# Patient Record
Sex: FEMALE | Race: WHITE | Employment: OTHER | URBAN - METROPOLITAN AREA
[De-identification: names, ages, dates, MRNs, and addresses within clinical notes are randomized per-mention and may not be internally consistent; named-entity substitution may affect disease eponyms.]

---

## 2021-02-11 ENCOUNTER — APPOINTMENT (OUTPATIENT)
Dept: CT IMAGING | Facility: CLINIC | Age: 71
DRG: 857 | End: 2021-02-11
Attending: PHYSICIAN ASSISTANT
Payer: COMMERCIAL

## 2021-02-11 ENCOUNTER — HOSPITAL ENCOUNTER (INPATIENT)
Facility: CLINIC | Age: 71
LOS: 2 days | Discharge: HOME HEALTH | DRG: 857 | End: 2021-02-13
Attending: INTERNAL MEDICINE | Admitting: INTERNAL MEDICINE
Payer: COMMERCIAL

## 2021-02-11 DIAGNOSIS — M86.60 CHRONIC OSTEOMYELITIS (H): Primary | ICD-10-CM

## 2021-02-11 PROBLEM — L02.01 SUBMENTAL ABSCESS: Status: ACTIVE | Noted: 2021-02-11

## 2021-02-11 LAB
ALBUMIN SERPL-MCNC: 3.6 G/DL (ref 3.4–5)
ALP SERPL-CCNC: 82 U/L (ref 40–150)
ALT SERPL W P-5'-P-CCNC: 30 U/L (ref 0–50)
ANION GAP SERPL CALCULATED.3IONS-SCNC: 7 MMOL/L (ref 3–14)
AST SERPL W P-5'-P-CCNC: 22 U/L (ref 0–45)
BILIRUB SERPL-MCNC: 0.4 MG/DL (ref 0.2–1.3)
BUN SERPL-MCNC: 17 MG/DL (ref 7–30)
CALCIUM SERPL-MCNC: 9.4 MG/DL (ref 8.5–10.1)
CHLORIDE SERPL-SCNC: 106 MMOL/L (ref 94–109)
CO2 SERPL-SCNC: 27 MMOL/L (ref 20–32)
CREAT SERPL-MCNC: 0.76 MG/DL (ref 0.52–1.04)
ERYTHROCYTE [DISTWIDTH] IN BLOOD BY AUTOMATED COUNT: 12.7 % (ref 10–15)
GFR SERPL CREATININE-BSD FRML MDRD: 78 ML/MIN/{1.73_M2}
GLUCOSE SERPL-MCNC: 146 MG/DL (ref 70–99)
HCT VFR BLD AUTO: 37.5 % (ref 35–47)
HGB BLD-MCNC: 12.5 G/DL (ref 11.7–15.7)
LABORATORY COMMENT REPORT: NORMAL
MAGNESIUM SERPL-MCNC: 2.2 MG/DL (ref 1.6–2.3)
MCH RBC QN AUTO: 30.3 PG (ref 26.5–33)
MCHC RBC AUTO-ENTMCNC: 33.3 G/DL (ref 31.5–36.5)
MCV RBC AUTO: 91 FL (ref 78–100)
PLATELET # BLD AUTO: 395 10E9/L (ref 150–450)
POTASSIUM SERPL-SCNC: 2.7 MMOL/L (ref 3.4–5.3)
POTASSIUM SERPL-SCNC: 3.1 MMOL/L (ref 3.4–5.3)
PROT SERPL-MCNC: 8.2 G/DL (ref 6.8–8.8)
RBC # BLD AUTO: 4.13 10E12/L (ref 3.8–5.2)
SARS-COV-2 RNA RESP QL NAA+PROBE: NEGATIVE
SODIUM SERPL-SCNC: 140 MMOL/L (ref 133–144)
SPECIMEN SOURCE: NORMAL
WBC # BLD AUTO: 10.7 10E9/L (ref 4–11)

## 2021-02-11 PROCEDURE — 86140 C-REACTIVE PROTEIN: CPT | Performed by: PHYSICIAN ASSISTANT

## 2021-02-11 PROCEDURE — 70487 CT MAXILLOFACIAL W/DYE: CPT

## 2021-02-11 PROCEDURE — 87635 SARS-COV-2 COVID-19 AMP PRB: CPT | Performed by: PHYSICIAN ASSISTANT

## 2021-02-11 PROCEDURE — 250N000009 HC RX 250: Performed by: INTERNAL MEDICINE

## 2021-02-11 PROCEDURE — 93005 ELECTROCARDIOGRAM TRACING: CPT

## 2021-02-11 PROCEDURE — 85027 COMPLETE CBC AUTOMATED: CPT | Performed by: PHYSICIAN ASSISTANT

## 2021-02-11 PROCEDURE — 99223 1ST HOSP IP/OBS HIGH 75: CPT | Mod: AI | Performed by: PHYSICIAN ASSISTANT

## 2021-02-11 PROCEDURE — 250N000013 HC RX MED GY IP 250 OP 250 PS 637: Performed by: PHYSICIAN ASSISTANT

## 2021-02-11 PROCEDURE — 83735 ASSAY OF MAGNESIUM: CPT | Performed by: PHYSICIAN ASSISTANT

## 2021-02-11 PROCEDURE — 84132 ASSAY OF SERUM POTASSIUM: CPT | Performed by: PHYSICIAN ASSISTANT

## 2021-02-11 PROCEDURE — 36415 COLL VENOUS BLD VENIPUNCTURE: CPT | Performed by: PHYSICIAN ASSISTANT

## 2021-02-11 PROCEDURE — 93010 ELECTROCARDIOGRAM REPORT: CPT | Performed by: INTERNAL MEDICINE

## 2021-02-11 PROCEDURE — 87040 BLOOD CULTURE FOR BACTERIA: CPT | Performed by: PHYSICIAN ASSISTANT

## 2021-02-11 PROCEDURE — 120N000001 HC R&B MED SURG/OB

## 2021-02-11 PROCEDURE — 80053 COMPREHEN METABOLIC PANEL: CPT | Performed by: PHYSICIAN ASSISTANT

## 2021-02-11 PROCEDURE — 250N000011 HC RX IP 250 OP 636: Performed by: INTERNAL MEDICINE

## 2021-02-11 PROCEDURE — 250N000011 HC RX IP 250 OP 636: Performed by: PHYSICIAN ASSISTANT

## 2021-02-11 RX ORDER — NALOXONE HYDROCHLORIDE 0.4 MG/ML
0.4 INJECTION, SOLUTION INTRAMUSCULAR; INTRAVENOUS; SUBCUTANEOUS
Status: DISCONTINUED | OUTPATIENT
Start: 2021-02-11 | End: 2021-02-13 | Stop reason: HOSPADM

## 2021-02-11 RX ORDER — PROCHLORPERAZINE MALEATE 5 MG
5 TABLET ORAL EVERY 6 HOURS PRN
Status: DISCONTINUED | OUTPATIENT
Start: 2021-02-11 | End: 2021-02-13 | Stop reason: HOSPADM

## 2021-02-11 RX ORDER — ONDANSETRON 4 MG/1
4 TABLET, ORALLY DISINTEGRATING ORAL EVERY 6 HOURS PRN
Status: DISCONTINUED | OUTPATIENT
Start: 2021-02-11 | End: 2021-02-13 | Stop reason: HOSPADM

## 2021-02-11 RX ORDER — AMOXICILLIN 250 MG
2 CAPSULE ORAL 2 TIMES DAILY PRN
Status: DISCONTINUED | OUTPATIENT
Start: 2021-02-11 | End: 2021-02-13 | Stop reason: HOSPADM

## 2021-02-11 RX ORDER — OXYCODONE HYDROCHLORIDE 5 MG/1
5 TABLET ORAL
Status: DISCONTINUED | OUTPATIENT
Start: 2021-02-11 | End: 2021-02-11

## 2021-02-11 RX ORDER — ACETAMINOPHEN 650 MG/1
650 SUPPOSITORY RECTAL EVERY 4 HOURS PRN
Status: DISCONTINUED | OUTPATIENT
Start: 2021-02-11 | End: 2021-02-13 | Stop reason: HOSPADM

## 2021-02-11 RX ORDER — AMLODIPINE BESYLATE 2.5 MG/1
2.5 TABLET ORAL EVERY MORNING
COMMUNITY

## 2021-02-11 RX ORDER — POTASSIUM CHLORIDE 1500 MG/1
40 TABLET, EXTENDED RELEASE ORAL
Status: COMPLETED | OUTPATIENT
Start: 2021-02-11 | End: 2021-02-11

## 2021-02-11 RX ORDER — POTASSIUM CHLORIDE 1500 MG/1
40 TABLET, EXTENDED RELEASE ORAL ONCE
Status: DISCONTINUED | OUTPATIENT
Start: 2021-02-11 | End: 2021-02-12

## 2021-02-11 RX ORDER — IBUPROFEN 200 MG
600 TABLET ORAL 3 TIMES DAILY PRN
COMMUNITY

## 2021-02-11 RX ORDER — LIDOCAINE 40 MG/G
CREAM TOPICAL
Status: DISCONTINUED | OUTPATIENT
Start: 2021-02-11 | End: 2021-02-13 | Stop reason: HOSPADM

## 2021-02-11 RX ORDER — NALOXONE HYDROCHLORIDE 0.4 MG/ML
0.2 INJECTION, SOLUTION INTRAMUSCULAR; INTRAVENOUS; SUBCUTANEOUS
Status: DISCONTINUED | OUTPATIENT
Start: 2021-02-11 | End: 2021-02-13 | Stop reason: HOSPADM

## 2021-02-11 RX ORDER — LEVOTHYROXINE SODIUM 75 UG/1
75 TABLET ORAL
COMMUNITY

## 2021-02-11 RX ORDER — ACETAMINOPHEN 500 MG
500 TABLET ORAL 3 TIMES DAILY PRN
COMMUNITY

## 2021-02-11 RX ORDER — AMOXICILLIN 250 MG
1 CAPSULE ORAL 2 TIMES DAILY PRN
Status: DISCONTINUED | OUTPATIENT
Start: 2021-02-11 | End: 2021-02-13 | Stop reason: HOSPADM

## 2021-02-11 RX ORDER — ONDANSETRON 2 MG/ML
4 INJECTION INTRAMUSCULAR; INTRAVENOUS EVERY 6 HOURS PRN
Status: DISCONTINUED | OUTPATIENT
Start: 2021-02-11 | End: 2021-02-13 | Stop reason: HOSPADM

## 2021-02-11 RX ORDER — AMLODIPINE BESYLATE 2.5 MG/1
2.5 TABLET ORAL EVERY MORNING
Status: DISCONTINUED | OUTPATIENT
Start: 2021-02-12 | End: 2021-02-13 | Stop reason: HOSPADM

## 2021-02-11 RX ORDER — BISACODYL 10 MG
10 SUPPOSITORY, RECTAL RECTAL DAILY PRN
Status: DISCONTINUED | OUTPATIENT
Start: 2021-02-11 | End: 2021-02-13 | Stop reason: HOSPADM

## 2021-02-11 RX ORDER — POLYETHYLENE GLYCOL 3350 17 G/17G
17 POWDER, FOR SOLUTION ORAL DAILY PRN
Status: DISCONTINUED | OUTPATIENT
Start: 2021-02-11 | End: 2021-02-13 | Stop reason: HOSPADM

## 2021-02-11 RX ORDER — PROCHLORPERAZINE 25 MG
12.5 SUPPOSITORY, RECTAL RECTAL EVERY 12 HOURS PRN
Status: DISCONTINUED | OUTPATIENT
Start: 2021-02-11 | End: 2021-02-13 | Stop reason: HOSPADM

## 2021-02-11 RX ORDER — ACETAMINOPHEN 325 MG/1
650 TABLET ORAL EVERY 4 HOURS PRN
Status: DISCONTINUED | OUTPATIENT
Start: 2021-02-11 | End: 2021-02-13 | Stop reason: HOSPADM

## 2021-02-11 RX ORDER — LEVOTHYROXINE SODIUM 75 UG/1
75 TABLET ORAL
Status: DISCONTINUED | OUTPATIENT
Start: 2021-02-12 | End: 2021-02-13 | Stop reason: HOSPADM

## 2021-02-11 RX ORDER — HYDROMORPHONE HYDROCHLORIDE 1 MG/ML
.3-.5 INJECTION, SOLUTION INTRAMUSCULAR; INTRAVENOUS; SUBCUTANEOUS
Status: DISCONTINUED | OUTPATIENT
Start: 2021-02-11 | End: 2021-02-13 | Stop reason: HOSPADM

## 2021-02-11 RX ORDER — ROSUVASTATIN CALCIUM 5 MG/1
5 TABLET, COATED ORAL EVERY MORNING
COMMUNITY

## 2021-02-11 RX ORDER — ERTAPENEM 1 G/1
1 INJECTION, POWDER, LYOPHILIZED, FOR SOLUTION INTRAMUSCULAR; INTRAVENOUS EVERY 24 HOURS
Status: DISCONTINUED | OUTPATIENT
Start: 2021-02-11 | End: 2021-02-13 | Stop reason: HOSPADM

## 2021-02-11 RX ORDER — IOPAMIDOL 755 MG/ML
80 INJECTION, SOLUTION INTRAVASCULAR ONCE
Status: COMPLETED | OUTPATIENT
Start: 2021-02-11 | End: 2021-02-11

## 2021-02-11 RX ORDER — ROSUVASTATIN CALCIUM 5 MG/1
5 TABLET, COATED ORAL EVERY MORNING
Status: DISCONTINUED | OUTPATIENT
Start: 2021-02-12 | End: 2021-02-13 | Stop reason: HOSPADM

## 2021-02-11 RX ADMIN — Medication 1 SPRAY: at 18:04

## 2021-02-11 RX ADMIN — POTASSIUM CHLORIDE 40 MEQ: 1500 TABLET, EXTENDED RELEASE ORAL at 18:36

## 2021-02-11 RX ADMIN — ERTAPENEM SODIUM 1 G: 1 INJECTION, POWDER, LYOPHILIZED, FOR SOLUTION INTRAMUSCULAR; INTRAVENOUS at 18:36

## 2021-02-11 RX ADMIN — OXYCODONE HYDROCHLORIDE 2.5 MG: 5 TABLET ORAL at 18:01

## 2021-02-11 RX ADMIN — ACETAMINOPHEN 650 MG: 325 TABLET, FILM COATED ORAL at 18:01

## 2021-02-11 RX ADMIN — SODIUM CHLORIDE 60 ML: 9 INJECTION, SOLUTION INTRAVENOUS at 18:17

## 2021-02-11 RX ADMIN — IOPAMIDOL 80 ML: 755 INJECTION, SOLUTION INTRAVENOUS at 18:17

## 2021-02-11 SDOH — HEALTH STABILITY: MENTAL HEALTH: HOW MANY STANDARD DRINKS CONTAINING ALCOHOL DO YOU HAVE ON A TYPICAL DAY?: NOT ASKED

## 2021-02-11 SDOH — HEALTH STABILITY: MENTAL HEALTH: HOW OFTEN DO YOU HAVE A DRINK CONTAINING ALCOHOL?: MONTHLY OR LESS

## 2021-02-11 SDOH — HEALTH STABILITY: MENTAL HEALTH: HOW OFTEN DO YOU HAVE 6 OR MORE DRINKS ON ONE OCCASION?: NOT ASKED

## 2021-02-11 ASSESSMENT — MIFFLIN-ST. JEOR: SCORE: 1166.79

## 2021-02-11 ASSESSMENT — ACTIVITIES OF DAILY LIVING (ADL): ADLS_ACUITY_SCORE: 17

## 2021-02-11 NOTE — CONSULTS
"Consultation Note    Chief Complaint:  \"There's a lump under my tongue.\"  History of Present Illness:  The patient is a 70 year old female with a past history of hypertension and hypercholesterolemia referred by an outside provider for evaluation of the anterior mandible. The patient's history in this region dates back to 11/2019 when tooth #25 was removed and grafted. An implant was subsequently placed (5/20) and removed (6/20). The neighboring tooth (#24) had a root canal completed at that time as well. The #25 implant was replaced (1/19/21). Tooth #24 subsequently became symptomatic and was removed. The extraction site developed discomfort and was noted to have poor healing this last weekend on exam with the patient's outside provider. This area was debrided and the patient was placed on clindamycin last weekend. The patient earlier today to her dentist and with the amount of bone loss was planned for removal of teeth #23, implant #25 and #26 with debridement. Purulent draingae was obtained. The patient was then referred to me for further evaluation. On exam today she complains of a \"lump under my tongue, and the pain is coming back.\" Patient denies fevers/chills, nause/avomiting, dysphagia, odynophagia.     Patient is a poor history and  is helpful for this portion of the evaluation.     Past Medical/Surgical History/Medications/Allergies/Adverse Reactions:  Reviewed.  Hypertension, hypothyroid, hypercholesterolemia. Prior hip replacement and toe surgery without anesthesia complications. Allergy to penicillin.    Review of Systems:  Reviewed as noted in the HPI.   Social History:  Non-smoker, no illicit drug use.     Physical Exam:  HEENT:  Submental edema and induration. Inferior mandibular border palpable. No submandibular swelling. Mandibular opening >45mm. No trismus. No lymphadenopathy.   Oral:  Extraction sites #23,24,25,26 with clot present. No hemorrhage. Right floor of mouth edema and ecchymosis. " No posterior sublingual swelling. No drainage. No purulence. No buccal vestibular swelling or fluctuance. No lateral pharyngeal swelling. Class I mobility of #22,27.     Imaging:  Periapical radiographs reviewed. Crestal bone loss associated with implant #25, extraction site #24 and bone loss #23,26 mesial.     CBCT mandible obtained to evaluate anterior mandible. Motion artifact. Left septal deviation. Normal nasal and sinus mucosa. Limited view CBCT with moth eaten radiolucency of the anterior mandibular buccal cortex.  Inferior mandibular boder not visualized. No evidence of fracture.     Assessment / Plan:   70-year-old female with history of hypertension and memory impairment who presents with osteolysis of the anterior mandible, clinically and radiographically consistent with osteomyelitis; however, cannot completely rule out malignancy.     - Plan for biopsy with local anesthesia today to rule out malignancy (please see clinic procedure note).  - Following this admit to [facility], Hospitalist service for further work-up and evaluation for anterior mandibular osteomyelitis.  - Labs on admission (BMP, CBC, ESR, CRP)  - Infectious disease consult: start IV antibiotics (failed outpatient therapy with Clindamycin), consider IV ertapenem, as likely will have long-term IV andtibiotics, coordination of PICC line placement if ID plan includes IV antibiotics.   - CT mandible w/contrast    Anticipate 1-2 night stay for coordination of above cares. No immediate airway compromise; therefore, likely able to continue to manage as outpatient once above cares arranged. OMS will continue to follow while inpatient.     Jluis Nascimento, MARISSA  [facility]  [address], [address]  [place] [address]  Clinic/On-call Phone: [phone]  Clinic Fax: [phone]

## 2021-02-11 NOTE — H&P
[facility]    History and Physical  Hospitalist       Date of Admission:  2/11/2021    Assessment & Plan   Carmita Parsons is a 70 year old female with PMHx hypothyroidism, Alzheimer's dementia, DL D, HTN who was a direct admission from OMFS clinic where she had a submental abscess drained, and bone biopsy obtained after found to have findings concerning for anterior mandibular osteomyelitis on CT imaging.    Concern for anterior mandibular osteomyelitis.  Submental abscess s/p bedside drainage (2/11/2021).  Recent extraction tooth #24 with pain and poor healing started on clindamycin approximately 1 week ago.  Periodontal visit on day of admission for tooth extraction and implant placement noted significant bone loss and purulent drainage.  Referred to OMFS and subsequent CT imaging showing osteolysis of the anterior mandible concerning for osteomyelitis.  - Bone biopsy obtained in clinic.  - Appreciate OMFS consultation.  - Appreciate ID consultation.  - Initiate IV ertapenem (as has unspecified PCN allergy) and failed outpatient clindamycin per OMFS recs.  - Obtain CT mandible with contrast, labs, ESR, and CRP per handoff recommendations.  - Mechanical soft diet and will make n.p.o. at midnight.  - NS 75ml/hr to start at 10p.    Hypokalemia.  Measuring 2.7 on adm.  - Add on Mag and replace per protocol.  - Give KLor 40meq now and replace per protocol.    Alzheimer's dementia.  Pleasantly confabulatory and not oriented to situation, place, or time on admission exam.  - Spouse Jacob ([phone]) is medical decision-maker is next of kin.  - Reorient as needed, maintain day/night cycle, bedside sitter if needed.  - Limit narcotics and sedating meds as able.    HTN / DLD.  - Cont PTA amlodipine and rosuvastatin once verified by pharmacy.    Hypothyroidism.  Levothyroxine.    Asymptomatic COVID-19 screen.  Pending 2/11/2021.    DVT Prophylaxis: Pneumatic Compression Devices  Code Status: DNR  / DNI as discussed with spouse Jacob who is medical decision-maker is her next of kin.    This patient was discussed with Dr. Barker of the Hospitalist Service who agrees with current plans as outlined above.    Disposition: Expected discharge in 2-3 days once OMFS recommendations and antibiotic plan in place.    JoAnna K. Barthell, PA-C    Primary Care Physician   Physician No Ref-Primary    Chief Complaint   Submental abscess and concern for anterior mandible osteomyelitis.    History is obtained from the patient which is limited secondary to her Alzheimer's dementia.  Majority of history obtained through spouse Jacob and through chart review.    History of Present Illness   Carmita Parsons is a 70 year old female with PMHx hypothyroidism, Alzheimer's dementia, DLD, HTN who was a direct admission from [facility] clinic where she had a submental abscess drained, and bone biopsy obtained after found to have findings concerning for anterior mandibular osteomyelitis on CT imaging.    Patient has had dental issues dating back to November 2019 when tooth #25 removed and grafted with subsequent implant in May 2020 followed by removal June 2020.  Around that time had root canal of tooth #24.  On 1/19/2021 the patient had a dental implant at tooth #25 and dental extraction at tooth #24 due to discomfort and was started on clindamycin approximately 1 week ago.  Had periodontal visit today for elective tooth #23 extraction and implantation at tooth #25 and #26.  During evaluation she was noted to have significant bone loss and purulent drainage.  This prompted referral to OMFS where CT mandibular imaging revealed moth-eaten radiolucency of the anterior mandibular buccal cortex concerning for malignancy versus osteomyelitis.  Bone biopsy obtained under local anesthesia and arrangements made for direct admission to [facility].    Complains of pain at present. Has been using APAP and Advil at home, but nothing since early AM.  "No recent fevers, swallowing difficulty, drooling, change in p.o. intake, nausea/vomiting.  Patient denies recent chest pain, difficulty breathing, coughing.  She does have history of bilateral total hip arthroplasty and has had surgery on her feet and is unclear if there is hardware present.  Has tolerated anesthesia well in the past.    Has penicillin listed as an allergy though it is uncertain what reaction she had.  Spouse informs her father was a surgeon and at one time reviewed his \"notes\" that indicated there was question of a possible penicillin allergy.    PAST MEDICAL HISTORY  Past Medical History:   Diagnosis Date     Benign essential hypertension      Hypothyroidism      Mixed hyperlipidemia      Presumed Alzheimer's disease     Diagnosed 2019 with [facility]     PAST SURGICAL HISTORY  Past Surgical History:   Procedure Laterality Date     ARTHROSCOPY KNEE       Bilateral feet surgery      Unclear as to presence of hardware     Left total hip arthroplasty       Right total hip arthroplasty       HOME MEDICATIONS  Amlodipine 2.5mg daily  Rosuvastatin 5mg daily  Levothyroxine 75mcg daily    ALLERGIES  Allergies   Allergen Reactions     Penicillins Unknown     SOCIAL HISTORY   reports that she has never smoked. She has never used smokeless tobacco. She reports current alcohol use. She reports that she does not use drugs.    FAMILY HISTORY  family history includes Breast Cancer in her mother and sister.    REVIEW OF SYSTEMS  A 10 point ROS was negative other than the symptoms noted above in the HPI.    Physical Exam   Nursing Notes Reviewed.  BP (!) 145/93   Pulse 57   Temp 97.6  F (36.4  C) (Oral)   Resp 16   Ht 1.676 m (5' 6\")   Wt 63.5 kg (140 lb)   BMI 22.60 kg/m     General:  Appears stated age in no acute distress. Alert. Confabulatory and not oriented to time, place, or situation.  Skin:  Warm, dry.  HEENT:  Normocephalic, atraumatic. EOMs grossly intact. Mild soft tissue swelling right " chin and lip with subtle induration. No overt erythema. Front teeth #23, 24, 25, 26 extracted with slight oozing blood and 1 absorbable stitch identified. Floor of mouth raised subtle on right compared to left. Airway widely patent.  Neck:  Supple. No carotid bruit. R ant chain cervical lymphadenopathy.  Chest:  Breath sounds CTA and no increased work of breathing on room air.  Cardiovascular:  Bradycardic, no rub or murmur. No peripheral edema.  Abdomen:  Soft, non-tender, non-distended.  Musculoskeletal:  Moves all four extremities.  Neurological:  CN 2-12 grossly intact.    Data   Data reviewed today:  I personally reviewed no images or EKG's today.  No lab results found in last 7 days.    Imaging:  No results found for this or any previous visit (from the past 24 hour(s)).

## 2021-02-12 LAB
CRP SERPL-MCNC: 56.1 MG/L (ref 0–8)
ERYTHROCYTE [SEDIMENTATION RATE] IN BLOOD BY WESTERGREN METHOD: 62 MM/H (ref 0–30)
POTASSIUM SERPL-SCNC: 2.9 MMOL/L (ref 3.4–5.3)
POTASSIUM SERPL-SCNC: 3 MMOL/L (ref 3.4–5.3)
POTASSIUM SERPL-SCNC: 3.2 MMOL/L (ref 3.4–5.3)

## 2021-02-12 PROCEDURE — 86140 C-REACTIVE PROTEIN: CPT | Performed by: PHYSICIAN ASSISTANT

## 2021-02-12 PROCEDURE — 250N000013 HC RX MED GY IP 250 OP 250 PS 637: Performed by: INTERNAL MEDICINE

## 2021-02-12 PROCEDURE — 250N000013 HC RX MED GY IP 250 OP 250 PS 637: Performed by: PHYSICIAN ASSISTANT

## 2021-02-12 PROCEDURE — 99232 SBSQ HOSP IP/OBS MODERATE 35: CPT | Performed by: INTERNAL MEDICINE

## 2021-02-12 PROCEDURE — 250N000011 HC RX IP 250 OP 636: Performed by: PHYSICIAN ASSISTANT

## 2021-02-12 PROCEDURE — 0W950ZZ DRAINAGE OF LOWER JAW, OPEN APPROACH: ICD-10-PCS | Performed by: DENTIST

## 2021-02-12 PROCEDURE — 84132 ASSAY OF SERUM POTASSIUM: CPT | Performed by: INTERNAL MEDICINE

## 2021-02-12 PROCEDURE — 87077 CULTURE AEROBIC IDENTIFY: CPT | Performed by: DENTIST

## 2021-02-12 PROCEDURE — 120N000001 HC R&B MED SURG/OB

## 2021-02-12 PROCEDURE — 36415 COLL VENOUS BLD VENIPUNCTURE: CPT | Performed by: INTERNAL MEDICINE

## 2021-02-12 PROCEDURE — 87075 CULTR BACTERIA EXCEPT BLOOD: CPT | Performed by: DENTIST

## 2021-02-12 PROCEDURE — 85652 RBC SED RATE AUTOMATED: CPT | Performed by: INTERNAL MEDICINE

## 2021-02-12 PROCEDURE — 87070 CULTURE OTHR SPECIMN AEROBIC: CPT | Performed by: DENTIST

## 2021-02-12 RX ORDER — POTASSIUM CHLORIDE 1500 MG/1
40 TABLET, EXTENDED RELEASE ORAL ONCE
Status: COMPLETED | OUTPATIENT
Start: 2021-02-12 | End: 2021-02-12

## 2021-02-12 RX ADMIN — Medication 1 SPRAY: at 05:17

## 2021-02-12 RX ADMIN — ROSUVASTATIN CALCIUM 5 MG: 5 TABLET, FILM COATED ORAL at 09:53

## 2021-02-12 RX ADMIN — Medication 1 SPRAY: at 14:05

## 2021-02-12 RX ADMIN — Medication 1 SPRAY: at 09:36

## 2021-02-12 RX ADMIN — ERTAPENEM SODIUM 1 G: 1 INJECTION, POWDER, LYOPHILIZED, FOR SOLUTION INTRAMUSCULAR; INTRAVENOUS at 17:08

## 2021-02-12 RX ADMIN — ACETAMINOPHEN 650 MG: 325 TABLET, FILM COATED ORAL at 09:18

## 2021-02-12 RX ADMIN — Medication 1 SPRAY: at 02:33

## 2021-02-12 RX ADMIN — POTASSIUM CHLORIDE 40 MEQ: 1500 TABLET, EXTENDED RELEASE ORAL at 18:28

## 2021-02-12 RX ADMIN — ACETAMINOPHEN 650 MG: 325 TABLET, FILM COATED ORAL at 17:07

## 2021-02-12 RX ADMIN — LEVOTHYROXINE SODIUM 75 MCG: 75 TABLET ORAL at 06:31

## 2021-02-12 RX ADMIN — AMLODIPINE BESYLATE 2.5 MG: 2.5 TABLET ORAL at 09:18

## 2021-02-12 ASSESSMENT — ACTIVITIES OF DAILY LIVING (ADL)
ADLS_ACUITY_SCORE: 16
ADLS_ACUITY_SCORE: 18
ADLS_ACUITY_SCORE: 18
ADLS_ACUITY_SCORE: 16
ADLS_ACUITY_SCORE: 18
ADLS_ACUITY_SCORE: 18

## 2021-02-12 NOTE — PROGRESS NOTES
"OMS PROGRESS NOTE:    SUBJECTIVE:  The patient is a 70 year old female with a past history of hypertension and hypercholesterolemia referred by an outside provider for evaluation of the anterior mandible. The patient's history in this region dates back to 11/2019 when tooth #25 was removed and grafted. An implant was subsequently placed (5/20) and removed (6/20). The neighboring tooth (#24) had a root canal completed at that time as well. The #25 implant was replaced (1/19/21). Tooth #24 subsequently became symptomatic and was removed. The extraction site developed discomfort and was noted to have poor healing this last weekend on exam with the patient's outside provider. This area was debrided and the patient was placed on clindamycin last weekend. The patient presented to her dentist  On 2/11/2021 and with the amount of bone loss was planned for removal of teeth #23, implant #25 and #26 with debridement. Purulent draingae was obtained. The patient was then referred to S for further evaluation and underwent a bone biopsy to rule out malignancy.      No acute overnight events. Patient reports pain in the inferoanterior mandible. Denies fevers, chills, dysphagia, SOB.     OBJECTIVE:    Vital signs:   BP (!) 143/88 (BP Location: Right arm)   Pulse (!) 47   Temp 98.1  F (36.7  C) (Axillary)   Resp 17   Ht 1.676 m (5' 6\")   Wt 63.5 kg (140 lb)   BMI 22.60 kg/m     Gen: Siting up in bed, No acute distress  Extra-oral: Firm, tender swelling of the anterior submental region. Angles and inferior borders palpable bilaterally. No trismus.  Intra-oral: I&D/biopsy site is hemostatic. No purulence. Sutures clean, moist and intact. Mild anterior floor or mouth edema, No sublingual swelling. Airway patent.   CV: Normal peripheral perfusion.  Lungs: Non-labored breathing.   Neuro: Moving all extremities  Psych: Cooperative    Labs:  Admission on 02/11/2021   Component Date Value Ref Range Status     Sodium 02/11/2021 140  " 133 - 144 mmol/L Final     Potassium 02/11/2021 2.7* 3.4 - 5.3 mmol/L Final     Chloride 02/11/2021 106  94 - 109 mmol/L Final     Carbon Dioxide 02/11/2021 27  20 - 32 mmol/L Final     Anion Gap 02/11/2021 7  3 - 14 mmol/L Final     Glucose 02/11/2021 146* 70 - 99 mg/dL Final     Urea Nitrogen 02/11/2021 17  7 - 30 mg/dL Final     Creatinine 02/11/2021 0.76  0.52 - 1.04 mg/dL Final     GFR Estimate 02/11/2021 78  >60 mL/min/[1.73_m2] Final    Comment: Non  GFR Calc  Starting 12/18/2018, serum creatinine based estimated GFR (eGFR) will be   calculated using the Chronic Kidney Disease Epidemiology Collaboration   (CKD-EPI) equation.       GFR Estimate If Black 02/11/2021 >90  >60 mL/min/[1.73_m2] Final    Comment:  GFR Calc  Starting 12/18/2018, serum creatinine based estimated GFR (eGFR) will be   calculated using the Chronic Kidney Disease Epidemiology Collaboration   (CKD-EPI) equation.       Calcium 02/11/2021 9.4  8.5 - 10.1 mg/dL Final     Bilirubin Total 02/11/2021 0.4  0.2 - 1.3 mg/dL Final     Albumin 02/11/2021 3.6  3.4 - 5.0 g/dL Final     Protein Total 02/11/2021 8.2  6.8 - 8.8 g/dL Final     Alkaline Phosphatase 02/11/2021 82  40 - 150 U/L Final     ALT 02/11/2021 30  0 - 50 U/L Final     AST 02/11/2021 22  0 - 45 U/L Final     WBC 02/11/2021 10.7  4.0 - 11.0 10e9/L Final     RBC Count 02/11/2021 4.13  3.8 - 5.2 10e12/L Final     Hemoglobin 02/11/2021 12.5  11.7 - 15.7 g/dL Final     Hematocrit 02/11/2021 37.5  35.0 - 47.0 % Final     MCV 02/11/2021 91  78 - 100 fl Final     MCH 02/11/2021 30.3  26.5 - 33.0 pg Final     MCHC 02/11/2021 33.3  31.5 - 36.5 g/dL Final     RDW 02/11/2021 12.7  10.0 - 15.0 % Final     Platelet Count 02/11/2021 395  150 - 450 10e9/L Final     Specimen Description 02/11/2021 Blood Right Arm   Final     Culture Micro 02/11/2021 No growth after 1 hour   Preliminary     SARS-CoV-2 Virus Specimen Source 02/11/2021 Nasopharyngeal   Final     SARS-CoV-2  PCR Result 02/11/2021 NEGATIVE   Final    SARS-CoV2 (COVID-19) RNA not detected, presumed negative.     SARS-CoV-2 PCR Comment 02/11/2021 (Note)   Final    Comment: Testing was performed using the shraddha SARS-CoV-2 & Influenza A/B Assay on the   shraddha Joselyn System.  This test should be ordered for the detection of SARS-COV-2 in individuals who   meet SARS-CoV-2 clinical and/or epidemiological criteria. Test performance is   unknown in asymptomatic patients.  This test is for in vitro diagnostic use under the FDA EUA for laboratories   certified under CLIA to perform moderate and/or high complexity testing. This   test has not been FDA cleared or approved.  A negative test does not rule out the presence of PCR inhibitors in the   specimen or target RNA in concentration below the limit of detection for the   assay. The possibility of a false negative should be considered if the   patient's recent exposure or clinical presentation suggests COVID-19.  [facility] are certified under the Clinical Laboratory   Improvement Amendments of 1988 (CLIA-88) as qualified to perform moderate   and/or high complexity laboratory testing.       Interpretation ECG 02/11/2021 Click View Image link to view waveform and result   Preliminary     Magnesium 02/11/2021 2.2  1.6 - 2.3 mg/dL Final     Potassium 02/11/2021 3.1* 3.4 - 5.3 mmol/L Final     CRP Inflammation 02/11/2021 56.1* 0.0 - 8.0 mg/L Final     Sed Rate 02/12/2021 62* 0 - 30 mm/h Final        Imaging:  The following imaging studies were reviewed. I agree with the radiologist's interpretation unless otherwise noted.   No images are attached to the encounter.   Results for orders placed or performed during the hospital encounter of 02/11/21   CT Facial Bones with Contrast    Narrative    CT SCAN OF THE FACE WITH CONTRAST 2/11/2021 6:33 PM     HISTORY: Sublingual/submandibular abscess. Concern for ant mandibular  osteomyelitis.    TECHNIQUE:  Axial scans of the  face following intravenous contrast  with sagittal and coronal reformations. Radiation dose for this scan  was reduced using automated exposure control, adjustment of the mA  and/or kV according to patient size, or iterative reconstruction  technique. 80 mL [id number]    COMPARISON: None.    FINDINGS: There is irregular osteolysis involving the anterior  paramedian aspect of the mandibular alveolus/mandibular symphysis, in  keeping with the patient's clinically given history of osteomyelitis.  The bilateral mandibular central and lateral incisors are absent.  There are multiple foci of air in the region of osteolytic change of  the anterior mandibular alveolar/symphysis.    There is a small fluid collection with peripheral soft tissue density  located along the anterior inferior aspect of the mandibular symphysis  measuring up to 0.7 x 0.7 x 1.9 cm (series 3 image 28, series 6 image  33) consistent with an abscess. There is prominent skin thickening and  subcutaneous fat stranding of the chin overlying the mandibular  symphysis/parasymphyseal region and ventricular bodies, consistent  with cellulitis.    Streak artifact from the patient's dental amalgam somewhat limits  evaluation of the oral cavity. No obvious drainable fluid collection  in the sublingual or submandibular spaces. The floor of mouth  structures appear within normal limits. Unremarkable appearance of the  visualized nasopharynx, oropharynx and supraglottic  larynx/hypopharynx.    Mildly prominent bilateral level 1 lymph nodes, likely reactive. Mild  right-sided carotid bifurcation atherosclerotic calcifications are  present. The orbits appear within normal limits. Mild mucosal  thickening in the ethmoid air cells is noted. The mastoid and middle  ear cavities are clear. Visualized intracranial contents are  unremarkable.      Impression    IMPRESSION:  1. Findings consistent with osteomyelitis involving the anterior  paramedian mandibular  alveolus/mandibular symphysis. Other  possibilities including osteolytic neoplastic disease are felt to be  less likely given the patient's clinical history and absence of a  definable enhancing soft tissue mass component. Multiple clustered  foci of air noted in the region of osteolytic change along the  anterior mandible, nonspecific. This could be related to recent  postprocedural change, although a gas-forming organism causing  infection cannot be excluded.  2. Small peripherally enhancing fluid collection along the  anterior-inferior aspect of the mandibular symphysis measuring up to  1.9 cm, compatible with a subcutaneous abscess. No drainable abscess  identified in the sublingual space/floor of mouth.   3. Findings consistent with cellulitis of the chin.    SILVER OLVERA MD        ASSESSMENT/PLAN :   70-year-old female with history of hypertension and memory impairment who presents with osteolysis of the anterior mandible, clinically and radiographically consistent with osteomyelitis; however, cannot completely rule out malignancy.      - Labs reviewed, elevated CRP, no leukocytosis  - Continue ertapenem IV, patient will require PICC for long-term abx as coordinated with ID  - CT scanned reviewed, small subcutaneous collection in anterior mandible  - Keep NPO for now, will update team later this morning with any planned surgical intervention if necessary     Anticipate 1-2 night stay for coordination of above cares. No immediate airway compromise; therefore, likely able to continue to manage as outpatient once above cares arranged. OMS will continue to follow while inpatient.    Charlie Gale DDS, MD  [facility]  [address], [address].  [place] [address]  Clinic/On call [phone]  Clinic Fax [phone]

## 2021-02-12 NOTE — PROGRESS NOTES
[facility]    Medicine Progress Note - Hospitalist Service       Date of Admission:  2/11/2021  Assessment & Plan        Carmita Parsons is a 70 year old female with PMHx hypothyroidism, Alzheimer's dementia, DL D, HTN who was a direct admission from OMFS clinic where she had a submental abscess drained, and bone biopsy obtained after found to have findings concerning for anterior mandibular osteomyelitis on CT imaging.     Concern for anterior mandibular osteomyelitis.  Recent extraction tooth #24 with pain and poor healing started on clindamycin approximately 1 week ago.  Periodontal visit on day of admission for tooth extraction and implant placement noted significant bone loss and purulent drainage.  Referred to OMFS and subsequent CT imaging showing osteolysis of the anterior mandible concerning for osteomyelitis.  -CT of face-osteomyelitis involving the anterior paramedian mandibular alveolus/mandibular symphysis. Fluid collection in the anterior-inferior aspect of the mandibular symphysis, compatible with abscess  - appreciate OMFS and ID consult  - plan for I & D anterior chin soft tissue fluid collection this afternoon by OMFS  - continue with Ertapenem   - follow cultures    Hypokalemia  Measuring 2.7 on adm. Improved to 3.1.  - recheck stat K, replace per protocol     Alzheimer's dementia.  Pleasantly confabulatory and not oriented to situation, place, or time on admission exam.  - Spouse Jacob ([phone]) is medical decision-maker is next of kin.  - Reorient as needed, maintain day/night cycle, bedside sitter if needed.  - Limit narcotics and sedating meds as able.     HTN / DLD.  - Cont PTA amlodipine and rosuvastatin once verified by pharmacy.     Hypothyroidism.  Levothyroxine.     Asymptomatic COVID-19 screen.  negative 2/11          Diet: NPO for Medical/Clinical Reasons Except for: Meds, Ice Chips    DVT Prophylaxis: Pneumatic Compression Devices  Chong Catheter: not  present  Code Status: No CPR- Do NOT Intubate           Disposition Plan   Expected discharge: 2 - 3 days, recommended to prior living arrangement once pending cultures results, course of antibiotics.  Entered: Rosa Pierre MD 02/12/2021, 2:11 PM       The patient's care was discussed with the Bedside Nurse, Patient and Patient's Family.    Rosa Pierre MD  Hospitalist Service  [facility]  Contact information available via [facility] Paging/Directory    ______________________________________________________________________    Interval History   Patient reports pain is a bit better.   Denies nausea or vomiting. Afebrile.  is at bedside.     Data reviewed today: I reviewed all medications, new labs and imaging results over the last 24 hours. I personally reviewed the ct of mandibile  image(s) showing as above.    Physical Exam   Vital Signs: Temp: 98.7  F (37.1  C) Temp src: Oral BP: 131/78 Pulse: 90   Resp: 18 SpO2: 96 % O2 Device: None (Room air)    Weight: 140 lbs 0 oz  General Appearance: Alert, awake and no apparent distress  Respiratory: clear to auscultation bilaterally, no wheezing  Cardiovascular: regular rate and rhythm  GI: soft and non tender  Skin: warm and dry    Data   Recent Labs   Lab 02/11/21  2248 02/11/21  1735   WBC  --  10.7   HGB  --  12.5   MCV  --  91   PLT  --  395   NA  --  140   POTASSIUM 3.1* 2.7*   CHLORIDE  --  106   CO2  --  27   BUN  --  17   CR  --  0.76   ANIONGAP  --  7   ZEESHAN  --  9.4   GLC  --  146*   ALBUMIN  --  3.6   PROTTOTAL  --  8.2   BILITOTAL  --  0.4   ALKPHOS  --  82   ALT  --  30   AST  --  22     Recent Results (from the past 24 hour(s))   CT Facial Bones with Contrast    Narrative    CT SCAN OF THE FACE WITH CONTRAST 2/11/2021 6:33 PM     HISTORY: Sublingual/submandibular abscess. Concern for ant mandibular  osteomyelitis.    TECHNIQUE:  Axial scans of the face following intravenous contrast  with sagittal and coronal  reformations. Radiation dose for this scan  was reduced using automated exposure control, adjustment of the mA  and/or kV according to patient size, or iterative reconstruction  technique. 80 mL Isovue-370    COMPARISON: None.    FINDINGS: There is irregular osteolysis involving the anterior  paramedian aspect of the mandibular alveolus/mandibular symphysis, in  keeping with the patient's clinically given history of osteomyelitis.  The bilateral mandibular central and lateral incisors are absent.  There are multiple foci of air in the region of osteolytic change of  the anterior mandibular alveolar/symphysis.    There is a small fluid collection with peripheral soft tissue density  located along the anterior inferior aspect of the mandibular symphysis  measuring up to 0.7 x 0.7 x 1.9 cm (series 3 image 28, series 6 image  33) consistent with an abscess. There is prominent skin thickening and  subcutaneous fat stranding of the chin overlying the mandibular  symphysis/parasymphyseal region and ventricular bodies, consistent  with cellulitis.    Streak artifact from the patient's dental amalgam somewhat limits  evaluation of the oral cavity. No obvious drainable fluid collection  in the sublingual or submandibular spaces. The floor of mouth  structures appear within normal limits. Unremarkable appearance of the  visualized nasopharynx, oropharynx and supraglottic  larynx/hypopharynx.    Mildly prominent bilateral level 1 lymph nodes, likely reactive. Mild  right-sided carotid bifurcation atherosclerotic calcifications are  present. The orbits appear within normal limits. Mild mucosal  thickening in the ethmoid air cells is noted. The mastoid and middle  ear cavities are clear. Visualized intracranial contents are  unremarkable.      Impression    IMPRESSION:  1. Findings consistent with osteomyelitis involving the anterior  paramedian mandibular alveolus/mandibular symphysis. Other  possibilities including osteolytic  neoplastic disease are felt to be  less likely given the patient's clinical history and absence of a  definable enhancing soft tissue mass component. Multiple clustered  foci of air noted in the region of osteolytic change along the  anterior mandible, nonspecific. This could be related to recent  postprocedural change, although a gas-forming organism causing  infection cannot be excluded.  2. Small peripherally enhancing fluid collection along the  anterior-inferior aspect of the mandibular symphysis measuring up to  1.9 cm, compatible with a subcutaneous abscess. No drainable abscess  identified in the sublingual space/floor of mouth.   3. Findings consistent with cellulitis of the chin.    SILVER OLVERA MD     Medications       amLODIPine  2.5 mg Oral QAM     ertapenem (INVanz) IV  1 g Intravenous Q24H     levothyroxine  75 mcg Oral QAM AC     potassium chloride  40 mEq Oral Once     rosuvastatin  5 mg Oral QAM     sodium chloride (PF)  3 mL Intracatheter Q8H

## 2021-02-12 NOTE — PLAN OF CARE
Pt admitted as direct admit to 304-1 with  at 1550. Pt anxious not thinking she needed to stay. A lot of convincing by the staff and  to admit her. Pt A&Ox4 but very forgetful. Pt does not follow instructions and use the call lights inappropriately. Bed alarm on. CMS intact. VSS on RA. .Taking Tylenol and Oxycodone for pain. Voiding BR. Continue to monitor.

## 2021-02-12 NOTE — PLAN OF CARE
Alert, confused to time & place. VSS. Saturating well on room air. Clear bilateral breath sound. NPO at midnight except meds/ice chips, (-) nv. Voiding adequately, normoactive BS x4. Independent. IV saline locked on R upper forearm. Denies pain. Up all night, sitter at the bed side. PICC line will be inserted today as per MD and possible to go home.

## 2021-02-12 NOTE — PROGRESS NOTES
[facility]    Received referral for IV abx.  Benefits verified. Pt has are Medicare, which does not cover IV ABX in the home. (Pt would have coverage for short term TCU or IC). Below is what pt would be responsible for if pt wanted to go with  home infusion:       Drug would go to Part-D (pt would be responsible for the co-pay per dispense)    Pt would have to self-pay for the per-marialuisa (daily)    If not homebound, nursing would also be self-pay (per visit)    Current on Ertapenem 1gm q24h, will be roughly $37.86 daily for drug and supplies. Nursing is covered only if pt is homebound.    Addendum @ 6389: I spoke with Carmita's , Jacob to introduce home infusion services, review benefits and offer choice of providers. He stated that he would prefer home infusion to infusion center. He would like to obtain additional prices for home infusion from [facility] and [facility]. [facility] will obtain comparative pricing quotes and will update Jacob.     Addendum @ 4233: Pricing from [facility] - Ertapenem 1gm q24h, will be $152/week, roughly $21. 71 daily for drug and supplies. Nursing is covered if homebound, if not homebound, 1st visit is $130 and then $65/visit thereafter. I spoke with pt's , Jacob via phone and he chose to proceed with [facility]. Due to being late in the day on a Friday, Jacob didn't want to wait for [facility]'s quote before making a decision. [facility] will update [facility] on Jacob's preference.     [facility] will sign out at this time. Thank you.    Ángela Barton RN  [facility] Liaison  [phone] (Mon thru Fri 8am - 5pm)  [phone] Office

## 2021-02-12 NOTE — PHARMACY-ADMISSION MEDICATION HISTORY
"Admission medication history interview status for the 2/11/2021  admission is complete. See EPIC admission navigator for prior to admission medications     Medication history source reliability:Good    Actions taken by pharmacist (provider contacted, etc):Referenced Care Everywhere and spoke to patient's  for medication list.    Additional medication history information not noted on PTA med list :None    Medication reconciliation/reorder completed by provider prior to medication history? No    Time spent in this activity: 20\"    Prior to Admission medications    Medication Sig Last Dose Taking? Auth Provider   acetaminophen (TYLENOL) 500 MG tablet Take 500 mg by mouth 3 times daily as needed for pain  prn Yes Unknown, Entered By History   amLODIPine (NORVASC) 2.5 MG tablet Take 2.5 mg by mouth every morning  2/11/2021 at am Yes Unknown, Entered By History   ibuprofen (ADVIL/MOTRIN) 200 MG tablet Take 600 mg by mouth 3 times daily as needed for pain 3 x 200 mg tabs prn Yes Unknown, Entered By History   levothyroxine (SYNTHROID/LEVOTHROID) 75 MCG tablet Take 75 mcg by mouth daily before breakfast 2/11/2021 at am Yes Unknown, Entered By History   rosuvastatin (CRESTOR) 5 MG tablet Take 5 mg by mouth every morning 2/11/2021 at am Yes Unknown, Entered By History         "

## 2021-02-12 NOTE — CONSULTS
Care Management Initial Consult    General Information  Assessment completed with:  ,         Primary Care Provider verified and updated as needed:     Readmission within the last 30 days:           Advance Care Planning:            Communication Assessment  Patient's communication style: spoken language (English or Bilingual)    Hearing Difficulty or Deaf: no   Wear Glasses or Blind: yes    Cognitive  Cognitive/Neuro/Behavioral: .WDL except  Level of Consciousness: alert, confused  Arousal Level: opens eyes spontaneously  Orientation: disoriented to, time, place, situation  Mood/Behavior: calm, cooperative  Best Language: 0 - No aphasia  Speech: clear    Living Environment:   People in home:       Current living Arrangements: house    3 story split level  Able to return to prior arrangements: yes       Family/Social Support:  Care provided by:  Self and spouse assist as needed  Provides care for: no one                Description of Support System:    Supportive involved       Current Resources:   Patient receiving home care services:       Community Resources:    Equipment currently used at home:    Supplies currently used at home:  none    Employment/Financial:  Employment Status:          Financial Concerns:      none voiced       Lifestyle & Psychosocial Needs:        Socioeconomic History     Marital status:      Spouse name: Not on file     Number of children: Not on file     Years of education: Not on file     Highest education level: Not on file     Tobacco Use     Smoking status: Never Smoker     Smokeless tobacco: Never Used   Substance and Sexual Activity     Alcohol use: Yes     Frequency: Monthly or less     Drug use: Never       Functional Status:  Prior to admission patient needed assistance:              Mental Health Status:          Chemical Dependency Status:                Values/Beliefs:  Spiritual, Cultural Beliefs, Islam Practices, Values that affect care:                  Additional Information:  Initial assessment completed. Briefly met with patient but was busy with cares. Noting her memory difficulties, I callled her spouse Jacob. I reviewed consult reason and that provider is anticipating need for IV antiboitics when ready to leave. I informed spouse that part of the POC is waiting on input from the infectious disease doctor regarding antibiotic choice. I explained that options would be for an outpatient infusion center if Carmita stayed on once a day medicine or home infusion. Jacob prefers to do the antibiotics at home if at all possible. He feels it would be easier for patient. I explained that I would send a rerral to Home Infusion to assess benefits and the liaison would reach out to him to review benefits and discuss what to expect with home infusion services. Jacob was agreeable to this plan. He plans to be back in the hospital this afternoon. Referral was sent to [facility] via standard process.      Jade Yost RN   [facility]   Phone [phone]

## 2021-02-12 NOTE — PLAN OF CARE
[id number] Pt. Alert, confused to time and place, forgetful. Needs frequent reorientation. Vital signs stable on RA. SBA. Lungs sounds clear. Denies pain. Bowel sounds active. Voiding. PIV saline locked. Sitter at bedside.

## 2021-02-12 NOTE — CONSULTS
[facility]    Infectious Disease Consultation     Date of Admission:  2/11/2021  Date of Consult (When I saw the patient): 02/12/21    Assessment & Plan   Carmita Parsons is a 70 year old female who was admitted on 2/11/2021.     Impression:  1. 70 y.o female with HTN   2. Admitted with complaints of lump under the tongue, after a dental extraction, detail history in OMFS notes.   3. CT imaging showing osteolysis of the anterior mandible concerning for osteomyelitis.  4. Plan for I&D of anterior chin soft tissue fluid collection.   5. Per dental surgery needs long course of antibiotics for osteo.   6. PCN listed as an allergy, reaction is unknown.   7. On ertapenem currently.     Recommendations:   1. Patient has no recollection of what was the allergic reaction to PCN, Ertapenem an ok choice given the current presentation.   2. Will follow up on dental surgery`s plan and cultures.     Piyush Summers MD    Reason for Consult   Reason for consult: I was asked to evaluate this patient for dental abscesses.    Primary Care Physician   Physician No Ref-Primary    Chief Complaint   Dental abscess     History is obtained from the patient and medical records    History of Present Illness   Carmita Parsons is a 70 year old female who presents with   Per dental surgery: The patient is a 70 year old female with a past history of hypertension referred by an outside provider for evaluation of the anterior mandible. The patient's history in this region dates back to 11/2019 when tooth #25 was removed and grafted. An implant was subsequently placed (5/20) and removed (6/20). The neighboring tooth (#24) had a root canal completed at that time as well. The #25 implant was replaced (1/19/21). Tooth #24 subsequently became symptomatic and was removed. The extraction site developed discomfort and was noted to have poor healing this last weekend on exam with the patient's outside provider. This area was debrided  "and the patient was placed on clindamycin last weekend. The patient earlier today to her dentist and with the amount of bone loss was planned for removal of teeth #23, implant #25 and #26 with debridement. Purulent draingae was obtained. The patient was then referred to me for further evaluation. On exam today she complains of a \"lump under my tongue, and the pain is coming back.\" Patient denies fevers/chills, nause/avomiting, dysphagia, odynophagia.     Past Medical History   I have reviewed this patient's medical history and updated it with pertinent information if needed.   Past Medical History:   Diagnosis Date     Benign essential hypertension      Hypothyroidism      Mixed hyperlipidemia      Presumed Alzheimer's disease     Diagnosed 2019 with [facility]       Past Surgical History   I have reviewed this patient's surgical history and updated it with pertinent information if needed.  Past Surgical History:   Procedure Laterality Date     ARTHROSCOPY KNEE       Bilateral feet surgery      Unclear as to presence of hardware     Left total hip arthroplasty       Right total hip arthroplasty         Prior to Admission Medications   Prior to Admission Medications   Prescriptions Last Dose Informant Patient Reported? Taking?   acetaminophen (TYLENOL) 500 MG tablet prn Spouse/Significant Other Yes Yes   Sig: Take 500 mg by mouth 3 times daily as needed for pain    amLODIPine (NORVASC) 2.5 MG tablet 2/11/2021 at am Spouse/Significant Other Yes Yes   Sig: Take 2.5 mg by mouth every morning    ibuprofen (ADVIL/MOTRIN) 200 MG tablet prn Spouse/Significant Other Yes Yes   Sig: Take 600 mg by mouth 3 times daily as needed for pain 3 x 200 mg tabs   levothyroxine (SYNTHROID/LEVOTHROID) 75 MCG tablet 2/11/2021 at am Spouse/Significant Other Yes Yes   Sig: Take 75 mcg by mouth daily before breakfast   rosuvastatin (CRESTOR) 5 MG tablet 2/11/2021 at am Spouse/Significant Other Yes Yes   Sig: Take 5 mg by mouth every " morning      Facility-Administered Medications: None     Allergies   Allergies   Allergen Reactions     Penicillins Unknown       Immunization History     There is no immunization history on file for this patient.    Social History   I have reviewed this patient's social history and updated it with pertinent information if needed. Carmita Parsons  reports that she has never smoked. She has never used smokeless tobacco. She reports current alcohol use. She reports that she does not use drugs.    Family History   I have reviewed this patient's family history and updated it with pertinent information if needed.   Family History   Problem Relation Age of Onset     Breast Cancer Mother      Breast Cancer Sister        Review of Systems   The 10 point Review of Systems is negative other than noted in the HPI or here.     Physical Exam   Temp: 99.2  F (37.3  C) Temp src: Oral BP: (!) 145/94 Pulse: 85   Resp: 18 SpO2: 97 % O2 Device: None (Room air)    Vital Signs with Ranges  Temp:  [97.6  F (36.4  C)-99.3  F (37.4  C)] 99.2  F (37.3  C)  Pulse:  [47-85] 85  Resp:  [16-18] 18  BP: (132-145)/(85-99) 145/94  SpO2:  [97 %] 97 %  140 lbs 0 oz  Body mass index is 22.6 kg/m .    GENERAL APPEARANCE:  awake  EYES: Eyes grossly normal to inspection, PERRL and conjunctivae and sclerae normal  HENT: ear canals and TM's normal and nose and mouth without ulcers or lesions  NECK: no adenopathy, no asymmetry, masses, or scars and thyroid normal to palpation  RESP: lungs clear to auscultation - no rales, rhonchi or wheezes  CV: regular rates and rhythm, normal S1 S2, no S3 or S4 and no murmur, click or rub  LYMPHATICS: normal ant/post cervical and supraclavicular nodes  ABDOMEN: soft, nontender, without hepatosplenomegaly or masses and bowel sounds normal  MS: extremities normal- no gross deformities noted  SKIN: no suspicious lesions or rashes      Data   Lab Results   Component Value Date    WBC 10.7 02/11/2021    HGB 12.5 02/11/2021     HCT 37.5 02/11/2021     02/11/2021     02/11/2021    POTASSIUM 3.1 (L) 02/11/2021    CHLORIDE 106 02/11/2021    CO2 27 02/11/2021    BUN 17 02/11/2021    CR 0.76 02/11/2021     (H) 02/11/2021    SED 62 (H) 02/12/2021    AST 22 02/11/2021    ALT 30 02/11/2021    ALKPHOS 82 02/11/2021    BILITOTAL 0.4 02/11/2021     Recent Labs   Lab 02/11/21  1734   CULT No growth after 10 hours     Recent Labs   Lab Test 02/11/21  1734   CULT No growth after 10 hours

## 2021-02-12 NOTE — PLAN OF CARE
Pt is A/Ox1-2, disoriented to time and place, situation.  visiting this morning, will be back later this afternoon. Plan for I&D of anterior chin soft tissue fluid collection at bedside, supplies gathered and at the bedside. Oral surgery will be in around 4 pm. VSS on RA, denies chest pain or SOB. LS clear. NPO since midnight. Sore area under the tongue, and swelling to chin, Tylenol x 1 given with good relief. Pt has a sitter due to impulsiveness and confusion. Calm and cooperative this shift. One BM today. Voiding per bathroom. Plan for long term IV abx, pt is on IV Ertapenem for now. ID is following. Pt will need PICC placed. CC is following for setting outpatient infusion center or home infusion. Family prefers home infusions.

## 2021-02-12 NOTE — PROGRESS NOTES
OMS Update Note    CT Maxillofacial Reviewed. Osteolysis of the anterior mandible. Foci of air consistent with biopsy yesterday. Small fluid collection noted in anterior mandible / symphysis.     Plan for I&D of anterior chin soft tissue fluid collection at bedside as it is amenable to this. Will perform with local anesthetic to avoid sedation/general anesthesia given patient's mental status. Will plan tentatively late afternoon. OK for mechanical soft diet from OMS perspective.     Continue plan:  - ID consultation, anticipate long-term antibiotics given osteomyelitis; however will await recs regarding PICC. Will obtain cultures from anterior mandibular fluid collection this PM.   - Appreciate Hospitalist cares and coordination of treatment    Please contact OMS with any questions/concerns.    Jluis Nascimento DDS  [facility]  [address], [address]  [place] [address]  Clinic/On-call Phone: [phone]  Clinic Fax: [phone]

## 2021-02-13 VITALS
RESPIRATION RATE: 16 BRPM | DIASTOLIC BLOOD PRESSURE: 73 MMHG | WEIGHT: 140 LBS | SYSTOLIC BLOOD PRESSURE: 128 MMHG | HEART RATE: 52 BPM | HEIGHT: 66 IN | BODY MASS INDEX: 22.5 KG/M2 | TEMPERATURE: 98.8 F | OXYGEN SATURATION: 98 %

## 2021-02-13 LAB
ANION GAP SERPL CALCULATED.3IONS-SCNC: 6 MMOL/L (ref 3–14)
BUN SERPL-MCNC: 22 MG/DL (ref 7–30)
CALCIUM SERPL-MCNC: 9.2 MG/DL (ref 8.5–10.1)
CHLORIDE SERPL-SCNC: 108 MMOL/L (ref 94–109)
CO2 SERPL-SCNC: 25 MMOL/L (ref 20–32)
CREAT SERPL-MCNC: 0.64 MG/DL (ref 0.52–1.04)
ERYTHROCYTE [DISTWIDTH] IN BLOOD BY AUTOMATED COUNT: 12.7 % (ref 10–15)
GFR SERPL CREATININE-BSD FRML MDRD: 89 ML/MIN/{1.73_M2}
GLUCOSE SERPL-MCNC: 124 MG/DL (ref 70–99)
HCT VFR BLD AUTO: 37.1 % (ref 35–47)
HGB BLD-MCNC: 12.4 G/DL (ref 11.7–15.7)
MCH RBC QN AUTO: 30.5 PG (ref 26.5–33)
MCHC RBC AUTO-ENTMCNC: 33.4 G/DL (ref 31.5–36.5)
MCV RBC AUTO: 91 FL (ref 78–100)
PLATELET # BLD AUTO: 380 10E9/L (ref 150–450)
POTASSIUM SERPL-SCNC: 3.6 MMOL/L (ref 3.4–5.3)
RBC # BLD AUTO: 4.07 10E12/L (ref 3.8–5.2)
SODIUM SERPL-SCNC: 139 MMOL/L (ref 133–144)
WBC # BLD AUTO: 8.4 10E9/L (ref 4–11)

## 2021-02-13 PROCEDURE — 250N000013 HC RX MED GY IP 250 OP 250 PS 637: Performed by: INTERNAL MEDICINE

## 2021-02-13 PROCEDURE — 80048 BASIC METABOLIC PNL TOTAL CA: CPT | Performed by: INTERNAL MEDICINE

## 2021-02-13 PROCEDURE — 36569 INSJ PICC 5 YR+ W/O IMAGING: CPT

## 2021-02-13 PROCEDURE — 250N000013 HC RX MED GY IP 250 OP 250 PS 637: Performed by: PHYSICIAN ASSISTANT

## 2021-02-13 PROCEDURE — 99238 HOSP IP/OBS DSCHRG MGMT 30/<: CPT | Performed by: INTERNAL MEDICINE

## 2021-02-13 PROCEDURE — 85027 COMPLETE CBC AUTOMATED: CPT | Performed by: INTERNAL MEDICINE

## 2021-02-13 PROCEDURE — 250N000011 HC RX IP 250 OP 636: Performed by: PHYSICIAN ASSISTANT

## 2021-02-13 PROCEDURE — 36415 COLL VENOUS BLD VENIPUNCTURE: CPT | Performed by: INTERNAL MEDICINE

## 2021-02-13 PROCEDURE — 272N000450 HC KIT 4FR POWER PICC SINGLE LUMEN

## 2021-02-13 PROCEDURE — 250N000009 HC RX 250: Performed by: PHYSICIAN ASSISTANT

## 2021-02-13 PROCEDURE — 999N000040 HC STATISTIC CONSULT NO CHARGE VASC ACCESS

## 2021-02-13 RX ORDER — POTASSIUM CHLORIDE 1500 MG/1
20 TABLET, EXTENDED RELEASE ORAL ONCE
Status: COMPLETED | OUTPATIENT
Start: 2021-02-13 | End: 2021-02-13

## 2021-02-13 RX ADMIN — LIDOCAINE HYDROCHLORIDE 1 ML: 10 INJECTION, SOLUTION INFILTRATION; PERINEURAL at 13:00

## 2021-02-13 RX ADMIN — AMLODIPINE BESYLATE 2.5 MG: 2.5 TABLET ORAL at 09:08

## 2021-02-13 RX ADMIN — LEVOTHYROXINE SODIUM 75 MCG: 75 TABLET ORAL at 06:33

## 2021-02-13 RX ADMIN — ROSUVASTATIN CALCIUM 5 MG: 5 TABLET, FILM COATED ORAL at 09:08

## 2021-02-13 RX ADMIN — POTASSIUM CHLORIDE 20 MEQ: 1500 TABLET, EXTENDED RELEASE ORAL at 03:57

## 2021-02-13 RX ADMIN — Medication 1 SPRAY: at 04:17

## 2021-02-13 RX ADMIN — ERTAPENEM SODIUM 1 G: 1 INJECTION, POWDER, LYOPHILIZED, FOR SOLUTION INTRAMUSCULAR; INTRAVENOUS at 15:40

## 2021-02-13 ASSESSMENT — ACTIVITIES OF DAILY LIVING (ADL)
ADLS_ACUITY_SCORE: 16
WEAR_GLASSES_OR_BLIND: YES
ADLS_ACUITY_SCORE: 16
CONCENTRATING,_REMEMBERING_OR_MAKING_DECISIONS_DIFFICULTY: YES
ADLS_ACUITY_SCORE: 16

## 2021-02-13 NOTE — DISCHARGE INSTRUCTIONS
Your doctor has ordered IV antibiotics after your hospital stay.  This service will be provided by [facility]. They will contact you regarding your first visit.   If you have any questions about this service, please call them at [phone].    Pricing from [facility] - Ertapenem 1gm q24h, will be $152/week, roughly $21. 71 daily for drug and supplies.   Nursing is covered if homebound, if not homebound, 1st visit is $130 and then $65/visit thereafter.

## 2021-02-13 NOTE — DISCHARGE SUMMARY
[facility]  Hospitalist Discharge Summary      Date of Admission:  2/11/2021  Date of Discharge:  2/13/2021  Discharging Provider: Rosa Pierre MD      Discharge Diagnoses   Anterior mandibular osteomyelitis.  Anterior mandibular vestibule abscess s/p bedside I & D on 2/12    Follow-ups Needed After Discharge   Follow-up Appointments     Follow-up and recommended labs and tests       Follow up with your oral surgeon as advised             Unresulted Labs Ordered in the Past 30 Days of this Admission     Date and Time Order Name Status Description    2/12/2021 1828 Anaerobic bacterial culture Preliminary     2/12/2021 1828 Abscess Culture Aerobic Bacterial Preliminary     2/11/2021 1643 Blood culture Preliminary           Discharge Disposition   Discharged to home  Condition at discharge: Stable    Hospital Course         Carmita Parsons is a 70 year old female with PMHx hypothyroidism, Alzheimer's dementia, DL D, HTN who was a direct admission [facility] where she had a submental abscess drained, and bone biopsy obtained after found to have findings concerning for anterior mandibular osteomyelitis on CT imaging.     Anterior mandibular osteomyelitis  Anterior mandibular vestibule abscess s/p bedside I & D on 2/12  Recent extraction tooth #24 with pain and poor healing started on clindamycin approximately 1 week ago.  Periodontal visit on day of admission for tooth extraction and implant placement noted significant bone loss and purulent drainage.  Referred [facility] and subsequent CT imaging showing osteolysis of the anterior mandible concerning for osteomyelitis.  -CT of face-osteomyelitis involving the anterior paramedian mandibular alveolus/mandibular symphysis. Fluid collection in the anterior-inferior aspect of the mandibular symphysis, compatible with abscess  - s/p I & D of abscess on 2/12, cultures thus far remain no growth to date  - continue with IV ertapenem x 6 weeks  per ID(reported allergy to Penicillin, unknown reaction)  - PICC will be placed prior to discharge  - OMFS and ID followed during hospital course    Hypokalemia- corrected     Alzheimer's dementia.  Pleasantly confabulatory and not oriented to situation, place, or time on admission exam.     HTN / DLD.  - Cont PTA amlodipine and rosuvastatin     Hypothyroidism.  Levothyroxine.     Asymptomatic COVID-19 screen.  negative 2/11         Consultations This Hospital Stay   INFECTIOUS DISEASES IP CONSULT  CARE MANAGEMENT / SOCIAL WORK IP CONSULT  ORAL SURGERY IP CONSULT  VASCULAR ACCESS ADULT IP CONSULT    Code Status   No CPR- Do NOT Intubate    Time Spent on this Encounter   I, Rosa Pierre MD, personally saw the patient today and spent 25 minutes discharging this patient.       Rosa Pierre MD  [place] [id number] [facility]  [address] [place] [address]  Phone: [phone]  ______________________________________________________________________    Physical Exam   Vital Signs: Temp: 98.8  F (37.1  C) Temp src: Oral BP: 128/73 Pulse: 52   Resp: 16 SpO2: 98 % O2 Device: None (Room air)    Weight: 140 lbs 0 oz  See progress note       Primary Care Physician   Jaren Lee    Discharge Orders      Home infusion referral      Reason for your hospital stay    Admitted for dental infection/mandibular osteomyelitis     Follow-up and recommended labs and tests     Follow up with your oral surgeon as advised     Activity    Your activity upon discharge: activity as tolerated     IV access    **Ordering Provider MUST call/page Care Coordinator/ to discuss arranging this service**    You are going home with the following vascular access device: PICC.     No CPR- Do NOT Intubate     Diet    Follow this diet upon discharge: Orders Placed This Encounter      Mechanical/Dental Soft Diet       Significant Results and Procedures   Most Recent 3 CBC's:  Recent Labs   Lab Test  02/13/21  0749 02/11/21  1735   WBC 8.4 10.7   HGB 12.4 12.5   MCV 91 91    395     Most Recent 3 BMP's:  Recent Labs   Lab Test 02/13/21  0749 02/12/21  2248 02/12/21  1815 02/11/21  1735 02/11/21  1735     --   --   --  140   POTASSIUM 3.6 3.2* 2.9*   < > 2.7*   CHLORIDE 108  --   --   --  106   CO2 25  --   --   --  27   BUN 22  --   --   --  17   CR 0.64  --   --   --  0.76   ANIONGAP 6  --   --   --  7   ZEESHAN 9.2  --   --   --  9.4   *  --   --   --  146*    < > = values in this interval not displayed.   ,   Results for orders placed or performed during the hospital encounter of 02/11/21   CT Facial Bones with Contrast    Narrative    CT SCAN OF THE FACE WITH CONTRAST 2/11/2021 6:33 PM     HISTORY: Sublingual/submandibular abscess. Concern for ant mandibular  osteomyelitis.    TECHNIQUE:  Axial scans of the face following intravenous contrast  with sagittal and coronal reformations. Radiation dose for this scan  was reduced using automated exposure control, adjustment of the mA  and/or kV according to patient size, or iterative reconstruction  technique. 80 mL Isovue-370    COMPARISON: None.    FINDINGS: There is irregular osteolysis involving the anterior  paramedian aspect of the mandibular alveolus/mandibular symphysis, in  keeping with the patient's clinically given history of osteomyelitis.  The bilateral mandibular central and lateral incisors are absent.  There are multiple foci of air in the region of osteolytic change of  the anterior mandibular alveolar/symphysis.    There is a small fluid collection with peripheral soft tissue density  located along the anterior inferior aspect of the mandibular symphysis  measuring up to 0.7 x 0.7 x 1.9 cm (series 3 image 28, series 6 image  33) consistent with an abscess. There is prominent skin thickening and  subcutaneous fat stranding of the chin overlying the mandibular  symphysis/parasymphyseal region and ventricular bodies, consistent  with  cellulitis.    Streak artifact from the patient's dental amalgam somewhat limits  evaluation of the oral cavity. No obvious drainable fluid collection  in the sublingual or submandibular spaces. The floor of mouth  structures appear within normal limits. Unremarkable appearance of the  visualized nasopharynx, oropharynx and supraglottic  larynx/hypopharynx.    Mildly prominent bilateral level 1 lymph nodes, likely reactive. Mild  right-sided carotid bifurcation atherosclerotic calcifications are  present. The orbits appear within normal limits. Mild mucosal  thickening in the ethmoid air cells is noted. The mastoid and middle  ear cavities are clear. Visualized intracranial contents are  unremarkable.      Impression    IMPRESSION:  1. Findings consistent with osteomyelitis involving the anterior  paramedian mandibular alveolus/mandibular symphysis. Other  possibilities including osteolytic neoplastic disease are felt to be  less likely given the patient's clinical history and absence of a  definable enhancing soft tissue mass component. Multiple clustered  foci of air noted in the region of osteolytic change along the  anterior mandible, nonspecific. This could be related to recent  postprocedural change, although a gas-forming organism causing  infection cannot be excluded.  2. Small peripherally enhancing fluid collection along the  anterior-inferior aspect of the mandibular symphysis measuring up to  1.9 cm, compatible with a subcutaneous abscess. No drainable abscess  identified in the sublingual space/floor of mouth.   3. Findings consistent with cellulitis of the chin.    SILVER OLVERA MD       Discharge Medications   Current Discharge Medication List      START taking these medications    Details   ertapenem (INVANZ) 1 GM injection Inject 1 g into the vein every 24 hours ESR,CRP,CBC with differential, creatinine, SGOT weekly while on this medication to be faxed to Dr. Summers office.  Qty: 400 mL, Refills: 0     Associated Diagnoses: Chronic osteomyelitis (H)         CONTINUE these medications which have NOT CHANGED    Details   acetaminophen (TYLENOL) 500 MG tablet Take 500 mg by mouth 3 times daily as needed for pain       amLODIPine (NORVASC) 2.5 MG tablet Take 2.5 mg by mouth every morning       ibuprofen (ADVIL/MOTRIN) 200 MG tablet Take 600 mg by mouth 3 times daily as needed for pain 3 x 200 mg tabs      levothyroxine (SYNTHROID/LEVOTHROID) 75 MCG tablet Take 75 mcg by mouth daily before breakfast      rosuvastatin (CRESTOR) 5 MG tablet Take 5 mg by mouth every morning           Allergies   Allergies   Allergen Reactions     Penicillins Unknown

## 2021-02-13 NOTE — PROGRESS NOTES
"OMS Progress Note  Carmita Parsons  [id number]  1950    Today's Date: 02/13/21    S: I&D completed yesterday, minimal discomfort overnight. No complaints of pain at this time. PICC being inserted today. No dysphagia or odynophagia.     O: Blood pressure 128/73, pulse 52, temperature 98.8  F (37.1  C), temperature source Oral, resp. rate 16, height 1.676 m (5' 6\"), weight 63.5 kg (140 lb), SpO2 98 %.  Gen: Comfortable/pleasant  HEENT: Anterior submental edema, firm, no fluctuance. Mandibular inferior border palpable. No trismus. Biopsy site hemostatic. No hemorrhage. No purulent drainage. No wound dehiscence. Floor of mouth soft posteriorly, anderior floor of mouth firm with interval decrease in edema from 2/11. No lateral pharyngeal swelling. Tongue non-elevated.   Resp: Non-labored on room air    CBC RESULTS:   Recent Labs   Lab Test 02/13/21  0749   WBC 8.4   RBC 4.07   HGB 12.4   HCT 37.1   MCV 91   MCH 30.5   MCHC 33.4   RDW 12.7        A/P: 70 year old female with history of hypertension, Alzheimer's and hypothyroid who presents with anterior mandibular osteomyelitis.     Pain control  Follow in-office biopsy results  Follow cultures/sensitivities  Appreciate ID consultation, appreciate vascular access/PICC team cares, long-term IV antibiotics for mandibular osteomyelitis.   Mechanical soft diet  Appreciate Hospitalist management and coordination of cares.    Please contact [facility] with any questions or concerns.    Jluis Nascimento DDS  [facility]  [address], [address]  [place] [address]  Clinic/On-call Phone: [phone]  Clinic Fax: [phone]    -   "

## 2021-02-13 NOTE — PLAN OF CARE
AVSS. Some swelling to face present. PICC placed, plan to discharge this evening after IV ABX administration. Sitter and spouse at bedside.

## 2021-02-13 NOTE — OP NOTE
Oral & Maxillofacial Surgery Operative Report  [facility]     Date of service: 02/12/2021    Patient: Carmita Parsons  Patient MRN: [id number]  Patient YOB: 1950    Surgeon: Jluis Nascimento DDS    Pre-operative diagnosis:  1. Abscess, anterior mandibular vestibule    Post-operative diagnosis:  1. Abscess, anterior mandibular vestibule    Procedures performed  1. Incision and drainage, anterior mandibular vestibule    Anesthesia: 6mL 2% lidocaine 1:200,000 epinephrine via local infiltration    Indications for the procedure: Carmita Parsons is a 70 year old female admitted for osteomyelitis of the anterior mandible, biopsy completed 2/11 to rule out malignancy. CT maxillofacial demonstrated osteolysis of the anterior mandible and fluid collection of the anterior mandibular soft tissues. No sublingual fluid collection. Patient planned for incision and drainage of fluid collection along with cultures/sensitivities. Given mental status, planned to complete at bedside with local anesthesia to avoid potential complications associated with general anesthesia. Additionally, will aim to treat osteomyelitis medically with long-term IV antibiotics, ID following.     Written and verbal consent obtained from patient's , medical decision maker.   Risks reviewed including but not limited post-operative pain, swelling, bleeding, infection, dehiscence of wounds, temporary/permanent paresthesia/anesthesia of CN V3 mental nerve distribution, scar formation, malocclusion, failure to resolve chief complaint, or need for additional procedures.  Agrees to procedure as written, signed consent in chart.      Description of procedure: Time out performed. The patient was then prepped and draped in a customary fashion for oral maxillofacial surgical procedures.     Attention was turned to the anterior mandible. Sutures removed from extraction sites. Buccal flap reflected. Scant purulent drainage aspirated  from anterior mandibular vestibule. Exploration with blunt dissection with curved hemostat to inferior border of mandible and anteriorly into pocket in mandibular vestibule noted on CT imaging. Scant purulent/sanguinous drainage obtained. Irrigated site. 3-0 gut suture placed to loosely reapproximate buccal flap. Hemostasis visualized. Patient tolerated well.      EBL: <5 mL  Fluids: See anesthesia report  Drains: None  Complications: None  Specimens: Aerobic / anaerobic cultures  Findings: Scant purulent/sanguinous drainage from anterior mandibular vestibule. Submitted swab for cultures/sensitivities.       Plan:   - Pain control post-operatively  - OK for mechanical soft diet from OMS perspective  - Appreciate Hospitalist cares  - Appreciate ID recommendations, anticipate discharge with IV antibiotics for mandibular osteomyelitis, initially placed on ertapenem due to penicillin allergy, failed outpatient therapy with clindamycin. Definitive antibiotic therapy per ID, will follow cultures/sensitivities.  - Anticipate discharge tomorrow as long as able to coordinate plans for PICC / IV abx at discharge.    OMS will continue to follow while inpatient. Please call with questions or concerns.     Jluis Nascimento DDS  [facility]  [address], [address]  [place] [address]  Clinic/On-call Phone: [phone]  Clinic Fax: [phone]

## 2021-02-13 NOTE — PROGRESS NOTES
Care Management Follow Up    Length of Stay (days): 2    Expected Discharge Date: (home)     Concerns to be Addressed: discharge planning     Patient plan of care discussed at interdisciplinary rounds: Yes     Anticipated Discharge Disposition: Home, Home Infusion  Disposition Comments: Initial assessment completed. Briefly met with patient but was busy with cares. Noting her memory difficulties, I callled her spouse Jacob.  I reviewed consult reason and that provider is anticipating need for IV antiboitics when ready to leave.  I informed spouse that  part of the POC is waiting on input from the infectious disease doctor regarding antibiotic choice. I explained that options would be for an outpatient infusion center if Carmita stayed on once a day medicine or home infusion. Jacob prefers to do the antibiotics at home if at all possible. He feels it would be easier for patient. I explained that I would send a rerral to Home Infusion to assess benefits and the liaison would reach out to him to review benefits and discuss what to expect with home infusion services. Jacob was agreeable to this plan. He plans to be back in the hospital this afternoon. Referral was sent to [facility] via standard process.  Anticipated Discharge Services:  Home Infusion via [facility]   Anticipated Discharge DME:  PICC line     Patient/family educated on Medicare website which has current facility and service quality ratings: no  Education Provided on the Discharge Plan:    Patient/Family in Agreement with the Plan: yes    Referrals Placed by CM/ANAYELI: Home Infusion  Private pay costs discussed: see Infusion Services note re: private pay costs   Note entered 2/12/21 by Ángela Barton     Additional Information:  Contacted [employer] (Phone: [phone] / Fax: [phone]) - direct to liaison today 02/13/21 [phone]  They are aware of patient and have provided the teach.  Orders faxed today.   They need  PICC placement information once available, including flushing orders (heparin vs saline).   ADDEDNUM: faxed PICC placement information 02/13/21 at 2:00 PM, called liaison to provide heads up.     Sherley Coleman RN, BSN, PHN  [facility]  Inpatient Care Management - FLOAT  Mobile: [phone] 02/13/21 until 4pm  (after today's date, please call the patient's unit)

## 2021-02-13 NOTE — PROCEDURES
[facility]    Single Lumen PICC Placement    Date/Time: 2/13/2021 1:50 PM  Performed by: Ángela Albright RN  Authorized by: Timothy Delgadillo MD   Indications: vascular access    UNIVERSAL PROTOCOL   Site Marked: Yes  Prior Images Obtained and Reviewed:  Yes  Required items: Required blood products, implants, devices and special equipment available    Patient identity confirmed:  Verbally with patient, arm band and hospital-assigned identification number  NA - No sedation, light sedation, or local anesthesia  Confirmation Checklist:  Patient's identity using two indicators, relevant allergies, procedure was appropriate and matched the consent or emergent situation and correct equipment/implants were available  Time out: Immediately prior to the procedure a time out was called    Universal Protocol: the Joint Commission Universal Protocol was followed    Preparation: Patient was prepped and draped in usual sterile fashion           ANESTHESIA    Anesthesia: Local infiltration  Local Anesthetic:  Lidocaine 1% without epinephrine  Anesthetic Total (mL):  1      SEDATION    Patient Sedated: No        Preparation: skin prepped with ChloraPrep  Skin prep agent: skin prep agent completely dried prior to procedure  Sterile barriers: maximum sterile barriers were used: cap, mask, sterile gown, sterile gloves, and large sterile sheet  Hand hygiene: hand hygiene performed prior to central venous catheter insertion  Type of line used: PICC  Catheter type: single lumen  Catheter size: 4 Fr  Brand: Bard  Lot number: [id number]  Placement method: MST and ultrasound  Number of attempts: 1  Successful placement: yes  Orientation: right  Location: basilic vein  Arm circumference: adults 10 cm  Extremity circumference: 27  Visible catheter length: 6  Internal length: 37 cm  Total catheter length: 43  Dressing and securement: chlorhexidine disc applied, occlusive dressing applied, securement device and  sterile dressing applied  Post procedure assessment: blood return through all ports  PROCEDURE   Patient Tolerance:  Patient tolerated the procedure well with no immediate complications  Describe Procedure: Successful placement of single lumen PICC RUE basilic vein. Good blood return noted. Placement verified with ECG and PICC is good for immediate use.

## 2021-02-13 NOTE — PLAN OF CARE
Patient alert to self only, disoriented of place, time and situation, I&D of the anterior mandibular vestibule was performed at the bedside by oral surgeon, Potassium was 3.0, 40mg of potassium tab given, recheck due at 2230, patient has a sitter at bedside,mechanical/dental diet. IV saline lock.

## 2021-02-13 NOTE — PLAN OF CARE
Alert to self, disoriented to time, place, and situation. VSS, Complained of mild oral pain, denied medications. K+ 3.2, replacement administered. Sitter at bedside. +void, +flatus, -BM. SBA.

## 2021-02-13 NOTE — PLAN OF CARE
Patient discharge instructions reviewed with . All questions answered. Patient discharging home with PICC for abx.

## 2021-02-13 NOTE — PROGRESS NOTES
[facility]    Medicine Progress Note - Hospitalist Service       Date of Admission:  2/11/2021  Assessment & Plan        Carmita Parsons is a 70 year old female with PMHx hypothyroidism, Alzheimer's dementia, DL D, HTN who was a direct admission from [facility] clinic where she had a submental abscess drained, and bone biopsy obtained after found to have findings concerning for anterior mandibular osteomyelitis on CT imaging.     Concern for anterior mandibular osteomyelitis.  Anterior mandibular vestibule abscess s/p bedside I & D on 2/12  Recent extraction tooth #24 with pain and poor healing started on clindamycin approximately 1 week ago.  Periodontal visit on day of admission for tooth extraction and implant placement noted significant bone loss and purulent drainage.  Referred to OMFS and subsequent CT imaging showing osteolysis of the anterior mandible concerning for osteomyelitis.  -CT of face-osteomyelitis involving the anterior paramedian mandibular alveolus/mandibular symphysis. Fluid collection in the anterior-inferior aspect of the mandibular symphysis, compatible with abscess  - s/p I & D of abscess on 2/12, cultures thus far remain no growth to date  - continue with IV ertapenem (reported allergy to Penicillin, unknown reaction)  - will need long term IV antibiotics at discharge  - await further ID recs regarding timing of PICC placement and course of antibiotics  - appreciate OMFS and ID consult    Hypokalemia- corrected  Measuring 2.7 on adm. Improved to 3.1.  - replace as needed     Alzheimer's dementia.  Pleasantly confabulatory and not oriented to situation, place, or time on admission exam.  - Spouse Jacob ([phone]) is medical decision-maker is next of kin.  - Reorient as needed, maintain day/night cycle, bedside sitter if needed.  - Limit narcotics and sedating meds as able.     HTN / DLD.  - Cont PTA amlodipine and rosuvastatin     Hypothyroidism.   Levothyroxine.     Asymptomatic COVID-19 screen.  negative 2/11          Diet: Mechanical/Dental Soft Diet    DVT Prophylaxis: Pneumatic Compression Devices  Chong Catheter: not present  Code Status: No CPR- Do NOT Intubate           Disposition Plan   Expected discharge: 1-2 days, recommended to prior living arrangement once pending cultures results, course of antibiotics.  Entered: Rosa Pierre MD 02/13/2021, 8:35 AM       The patient's care was discussed with the Bedside Nurse, Patient and Patient's Family.    Rosa Pierre MD  Hospitalist Service  [facility]  Contact information available via [facility] Paging/Directory    ______________________________________________________________________    Interval History   Pain controlled.   She is afebrile. Denies nausea or vomiting.     Data reviewed today: I reviewed all medications, new labs and imaging results over the last 24 hours. I personally reviewed no images or EKG's today.    Physical Exam   Vital Signs: Temp: 98.8  F (37.1  C) Temp src: Oral BP: 128/73 Pulse: 52   Resp: 16 SpO2: 98 % O2 Device: None (Room air)    Weight: 140 lbs 0 oz  General Appearance: Alert, awake and no apparent distress  Respiratory: clear to auscultation bilaterally, no wheezing  Cardiovascular: regular rate and rhythm  GI: soft and non tender  Skin: warm and dry    Data   Recent Labs   Lab 02/13/21  0749 02/12/21  2248 02/12/21  1815 02/11/21  1735 02/11/21  1735   WBC 8.4  --   --   --  10.7   HGB 12.4  --   --   --  12.5   MCV 91  --   --   --  91     --   --   --  395     --   --   --  140   POTASSIUM 3.6 3.2* 2.9*   < > 2.7*   CHLORIDE 108  --   --   --  106   CO2 25  --   --   --  27   BUN 22  --   --   --  17   CR 0.64  --   --   --  0.76   ANIONGAP 6  --   --   --  7   ZEESHAN 9.2  --   --   --  9.4   *  --   --   --  146*   ALBUMIN  --   --   --   --  3.6   PROTTOTAL  --   --   --   --  8.2   BILITOTAL  --   --   --   --   0.4   ALKPHOS  --   --   --   --  82   ALT  --   --   --   --  30   AST  --   --   --   --  22    < > = values in this interval not displayed.     No results found for this or any previous visit (from the past 24 hour(s)).  Medications       amLODIPine  2.5 mg Oral QAM     ertapenem (INVanz) IV  1 g Intravenous Q24H     levothyroxine  75 mcg Oral QAM AC     rosuvastatin  5 mg Oral QAM     sodium chloride (PF)  3 mL Intracatheter Q8H

## 2021-02-13 NOTE — PROGRESS NOTES
"[facility]  Infectious Disease Progress Note          Assessment and Plan:   Assessment & Plan     Carmita Parsons is a 70 year old female who was admitted on 2/11/2021.      Impression:  1. 70 y.o female with HTN   2. Admitted with complaints of lump under the tongue, after a dental extraction, detail history in OMFS notes.   3. CT imaging showing osteolysis of the anterior mandible concerning for osteomyelitis.  4. Plan for I&D of anterior chin soft tissue fluid collection.   5. Per dental surgery needs long course of antibiotics for osteo.   6. PCN listed as an allergy, reaction is unknown.   7. On ertapenem currently.      Recommendations:   1. Patient has no recollection of what was the allergic reaction to PCN, Ertapenem an ok choice given the current presentation.   2. Op noted, pendingcultures, but almost certain erta covering  3 PICC and 6 weeks IV, orders in, discussed with [facility] RN, pt and  OK home                 Interval History:   no new complaints and doing well; no cp, sob, n/v/d, or abd pain. Op noted cx pending has some home coverage              Medications:       amLODIPine  2.5 mg Oral QAM     ertapenem (INVanz) IV  1 g Intravenous Q24H     levothyroxine  75 mcg Oral QAM AC     rosuvastatin  5 mg Oral QAM     sodium chloride (PF)  3 mL Intracatheter Q8H                  Physical Exam:   Blood pressure 128/73, pulse 52, temperature 98.8  F (37.1  C), temperature source Oral, resp. rate 16, height 1.676 m (5' 6\"), weight 63.5 kg (140 lb), SpO2 98 %.  Wt Readings from Last 2 Encounters:   02/11/21 63.5 kg (140 lb)     Vital Signs with Ranges  Temp:  [98  F (36.7  C)-98.8  F (37.1  C)] 98.8  F (37.1  C)  Pulse:  [52-90] 52  Resp:  [16-20] 16  BP: (125-144)/(73-94) 128/73  SpO2:  [96 %-98 %] 98 %    Constitutional: Awake, alert, cooperative, no apparent distress   Lungs: Clear to auscultation bilaterally, no crackles or wheezing   Cardiovascular: Regular rate and " rhythm, normal S1 and S2, and no murmur noted   Abdomen: Normal bowel sounds, soft, non-distended, non-tender   Skin: No rashes, no cyanosis, no edema   Other:           Data:   All microbiology laboratory data reviewed.  Recent Labs   Lab Test 02/13/21  0749 02/11/21  1735   WBC 8.4 10.7   HGB 12.4 12.5   HCT 37.1 37.5   MCV 91 91    395     Recent Labs   Lab Test 02/13/21  0749 02/11/21  1735   CR 0.64 0.76     Recent Labs   Lab Test 02/12/21  0625   SED 62*     Recent Labs   Lab Test 02/12/21  1800 02/11/21  1734   CULT Culture negative monitoring continues  PENDING No growth after 2 days

## 2021-02-13 NOTE — PROGRESS NOTES
Care Management Discharge Note    Discharge Date: 02/13/21    Discharge Disposition: Home, Home Infusion    Discharge Services:  Home Infusion via [facility] (orders were faxed to [phone])     Discharge DME:  PICC line     Discharge Transportation: family or friend will provide    Private pay costs discussed: discussed with pt via home infusion liaison     PAS Confirmation Code:  n/a  Patient/family educated on Medicare website which has current facility and service quality ratings: no    Education Provided on the Discharge Plan:  On AVS, and by Home Infusion service   Persons Notified of Discharge Plans: [facility], Bedside RN   Patient/Family in Agreement with the Plan: yes (see prior documentation)     Handoff Referral Completed: n/a    Additional Information:  Orders were faxed to [facility] home infusion fax [phone], weekend liaison [phone] previously shared teach is completed and they anticipate discharge time of 1500. (Confirmed receipt of documents 02/13/21 3:11 PM)    AVS updated with contact info for pt/spouse:     Your doctor has ordered IV antibiotics after your hospital stay.  This service will be provided by [facility]. They will contact you regarding your first visit.   If you have any questions about this service, please call them at [phone].    Pricing from [facility] - Ertapenem 1gm q24h, will be $152/week, roughly $21. 71 daily for drug and supplies.   Nursing is covered if homebound, if not homebound, 1st visit is $130 and then $65/visit thereafter.       Sherley Coleman RN, BSN, PHN  [facility]  Inpatient Care Management - FLOAT  Mobile: [phone] 02/13/21 until 4pm  (after today's date, please call the patient's unit)

## 2021-02-15 LAB
BACTERIA SPEC CULT: ABNORMAL
INTERPRETATION ECG - MUSE: NORMAL
Lab: ABNORMAL
SPECIMEN SOURCE: ABNORMAL

## 2021-02-17 LAB
BACTERIA SPEC CULT: NO GROWTH
SPECIMEN SOURCE: NORMAL

## 2021-02-19 LAB
BACTERIA SPEC CULT: NORMAL
Lab: NORMAL
SPECIMEN SOURCE: NORMAL

## 2021-03-24 ENCOUNTER — MEDICAL CORRESPONDENCE (OUTPATIENT)
Dept: HEALTH INFORMATION MANAGEMENT | Facility: CLINIC | Age: 71
End: 2021-03-24

## 2021-03-26 ENCOUNTER — ONCOLOGY VISIT (OUTPATIENT)
Dept: ONCOLOGY | Facility: CLINIC | Age: 71
End: 2021-03-26
Payer: COMMERCIAL

## 2021-03-26 DIAGNOSIS — L02.01 SUBMENTAL ABSCESS: Primary | ICD-10-CM

## 2021-03-26 PROCEDURE — 99207 PR NO CHARGE NURSE ONLY: CPT

## 2021-03-26 NOTE — PROGRESS NOTES
Infusion Nursing Note:  Carmita Parsons presents today for PICC line removal.    Patient seen by provider today: No   present during visit today: Not Applicable.    Note: Orders received from Piyush Summers MD to remove PICC today as her therapy was complete. 43 cm removed with no problems. Pressure dressing applied and instructions to keep it covered as tolerated.  at beside and verbalized understanding.     Patient did meet criteria for an asymptomatic covid-19 PCR test in infusion today. Patient declined the covid-19 test.    Intravenous Access:  PICC.    Treatment Conditions:  Not Applicable.      Post Infusion Assessment:  Site patent and intact, free from redness, edema or discomfort.  No evidence of extravasations.  Access discontinued per protocol.       Discharge Plan:   Discharge instructions reviewed with: Patient.  Patient and/or family verbalized understanding of discharge instructions and all questions answered.  Patient discharged in stable condition accompanied by: self.  Departure Mode: Ambulatory.    Sandra Gomes RN